# Patient Record
(demographics unavailable — no encounter records)

---

## 2024-10-14 NOTE — HISTORY OF PRESENT ILLNESS
[FreeTextEntry1] : A case of hypertension and nocturia in the setting of sleep apnea and hyperlipidemia. Patient has come for evaluation.

## 2024-10-14 NOTE — PHYSICAL EXAM
[General Appearance - Alert] : alert [General Appearance - In No Acute Distress] : in no acute distress [General Appearance - Well Developed] : well developed [Sclera] : the sclera and conjunctiva were normal [Outer Ear] : the ears and nose were normal in appearance [Neck Appearance] : the appearance of the neck was normal [Neck Cervical Mass (___cm)] : no neck mass was observed [] : no respiratory distress [Respiration, Rhythm And Depth] : normal respiratory rhythm and effort [Exaggerated Use Of Accessory Muscles For Inspiration] : no accessory muscle use [Apical Impulse] : the apical impulse was normal [Heart Rate And Rhythm] : heart rate was normal and rhythm regular [Heart Sounds] : normal S1 and S2 [Edema] : there was no peripheral edema [Bowel Sounds] : normal bowel sounds [Abdomen Soft] : soft [Abdomen Tenderness] : non-tender [Cervical Lymph Nodes Enlarged Posterior Bilaterally] : posterior cervical [Cervical Lymph Nodes Enlarged Anterior Bilaterally] : anterior cervical [Supraclavicular Lymph Nodes Enlarged Bilaterally] : supraclavicular [No CVA Tenderness] : no ~M costovertebral angle tenderness [No Spinal Tenderness] : no spinal tenderness [Abnormal Walk] : normal gait [Nail Clubbing] : no clubbing  or cyanosis of the fingernails [Musculoskeletal - Swelling] : no joint swelling seen [Skin Color & Pigmentation] : normal skin color and pigmentation [Oriented To Time, Place, And Person] : oriented to person, place, and time

## 2024-10-14 NOTE — ASSESSMENT
[FreeTextEntry1] : A case of hypertension and nocturia in the setting of sleep apnea, diabetes mellitus and hyperlipidemia. Patient has come for evaluation. Patient has gained 4 lbs. BP is controlled. No pedal edema. Cause of hypertension could sleep apnea and diabetes. I could be essential or related to new OBEY. Advised renal duplex, bladder sonogram, serum aldosterone, plasma renin, urine analysis and  immunoglobulin profile.

## 2024-10-14 NOTE — REVIEW OF SYSTEMS
[Feeling Poorly] : not feeling poorly [Feeling Tired] : not feeling tired [Recent Weight Gain (___ Lbs)] : recent [unfilled] ~Ulb weight gain [Eyesight Problems] : no eyesight problems [Chest Pain] : no chest pain [Palpitations] : no palpitations [Lower Ext Edema] : no extremity edema [SOB on Exertion] : no shortness of breath during exertion [Nocturia] : nocturia [Joint Swelling] : no joint swelling [Joint Stiffness] : no joint stiffness [Itching] : no itching [Dizziness] : no dizziness [Fainting] : no fainting [Anxiety] : no anxiety [Depression] : no depression [Easy Bleeding] : no tendency for easy bleeding [Easy Bruising] : no tendency for easy bruising [Negative] : ENT

## 2024-10-17 NOTE — REVIEW OF SYSTEMS
[Feeling Poorly] : not feeling poorly [Feeling Tired] : not feeling tired [Eyesight Problems] : no eyesight problems [Chest Pain] : no chest pain [Palpitations] : no palpitations [Lower Ext Edema] : no extremity edema [SOB on Exertion] : no shortness of breath during exertion [Joint Swelling] : no joint swelling [Joint Stiffness] : no joint stiffness [Itching] : no itching [Dizziness] : no dizziness [Fainting] : no fainting [Anxiety] : no anxiety [Depression] : no depression [Easy Bleeding] : no tendency for easy bleeding [Easy Bruising] : no tendency for easy bruising

## 2024-10-17 NOTE — ASSESSMENT
[FreeTextEntry1] : A case of hypertension and nocturia in the setting of sleep apnea, diabetes mellitus and hyperlipidemia. Patient has come for evaluation. Patient has gained 4 lbs. BP is controlled. No pedal edema. Cause of hypertension could sleep apnea and diabetes. I could be essential or related to new OBEY. Advised renal duplex, bladder sonogram, serum aldosterone, plasma renin, urine analysis and  immunoglobulin profile. Lab results were discussed with Dr. Molina. There is a decrease in vit D levels (14.9 ng/ml). Advised to start oral Vit D3. There is a mild increase in serum Kappa levels but serum immunofixation  did not show any monoclonal band. Serum aldosterone and direct plasma renin levels are normal. Renal duplex report is awaited.